# Patient Record
Sex: FEMALE | Race: BLACK OR AFRICAN AMERICAN | NOT HISPANIC OR LATINO | URBAN - METROPOLITAN AREA
[De-identification: names, ages, dates, MRNs, and addresses within clinical notes are randomized per-mention and may not be internally consistent; named-entity substitution may affect disease eponyms.]

---

## 2021-04-13 ENCOUNTER — LAB OUTSIDE AN ENCOUNTER (OUTPATIENT)
Dept: URBAN - METROPOLITAN AREA CLINIC 96 | Facility: CLINIC | Age: 56
End: 2021-04-13

## 2021-04-14 ENCOUNTER — OFFICE VISIT (OUTPATIENT)
Dept: URBAN - METROPOLITAN AREA CLINIC 96 | Facility: CLINIC | Age: 56
End: 2021-04-14

## 2021-04-14 RX ORDER — OMEPRAZOLE 20 MG/1
TABLET, DELAYED RELEASE ORAL
Qty: 0 | Refills: 0 | COMMUNITY
Start: 1900-01-01

## 2021-04-14 RX ORDER — LEVOTHYROXINE SODIUM 25 UG/1
TABLET ORAL
Qty: 0 | Refills: 0 | COMMUNITY
Start: 1900-01-01

## 2021-04-14 RX ORDER — SIMVASTATIN 10 MG/1
TABLET, FILM COATED ORAL
Qty: 0 | Refills: 0 | COMMUNITY
Start: 1900-01-01

## 2021-04-14 NOTE — HPI-TODAY'S VISIT:
This is a pleasant 56-year-old -American female referred by Dr. Mena Rush for follow-up of some GI issues and a copy of this note will be referred to the referring physician.  Patient was last seen by me in April 2018 at which time she followed up for history of GERD and chronic constipation.  She was complaining of some belching and burping despite being on PPIs.  She had some mild dysphagia.  She also had a history of some constipation and in the past had gone about twice a week.  She had a colonoscopy on September 14, 2017 that showed a 4 mm tubular adenoma and 3 mm benign lesion removed and internal hemorrhoids.  I had ordered Amitiza for constipation but she wanted to try MiraLAX.  She was doing better on omeprazole for GERD.  She had a CT scan for abdominal pain that showed some hypodensities of the liver too small to characterize so an MRI was done and these lesions did appear benign but I did refer her to see Dr. Lara of our liver center and she did see him in June 2018 as MRI in 5/2018 suggested cysts, adenomas or FNH and he felt they were benign.  I had ordered both upper endoscopy to evaluate her abdominal pain and she was not able then to do the procedures.

## 2021-07-15 ENCOUNTER — DASHBOARD ENCOUNTERS (OUTPATIENT)
Age: 56
End: 2021-07-15

## 2021-07-15 ENCOUNTER — OFFICE VISIT (OUTPATIENT)
Dept: URBAN - METROPOLITAN AREA CLINIC 92 | Facility: CLINIC | Age: 56
End: 2021-07-15
Payer: COMMERCIAL

## 2021-07-15 DIAGNOSIS — K21.9 GASTROESOPHAGEAL REFLUX DISEASE, UNSPECIFIED WHETHER ESOPHAGITIS PRESENT: ICD-10-CM

## 2021-07-15 DIAGNOSIS — K59.04 CHRONIC IDIOPATHIC CONSTIPATION: ICD-10-CM

## 2021-07-15 DIAGNOSIS — F50.9 EATING DISORDER, UNSPECIFIED TYPE: ICD-10-CM

## 2021-07-15 DIAGNOSIS — K76.9 LIVER LESION: ICD-10-CM

## 2021-07-15 DIAGNOSIS — Z86.010 PERSONAL HISTORY OF COLONIC POLYPS: ICD-10-CM

## 2021-07-15 PROBLEM — 82934008: Status: ACTIVE | Noted: 2021-04-13

## 2021-07-15 PROBLEM — 300331000: Status: ACTIVE | Noted: 2021-04-13

## 2021-07-15 PROBLEM — 428283002: Status: ACTIVE | Noted: 2021-04-13

## 2021-07-15 PROCEDURE — 99204 OFFICE O/P NEW MOD 45 MIN: CPT | Performed by: INTERNAL MEDICINE

## 2021-07-15 RX ORDER — SIMVASTATIN 10 MG/1
TABLET, FILM COATED ORAL
Qty: 0 | Refills: 0 | Status: ON HOLD | COMMUNITY
Start: 1900-01-01

## 2021-07-15 RX ORDER — LEVOTHYROXINE SODIUM 25 UG/1
TABLET ORAL
Qty: 0 | Refills: 0 | Status: ACTIVE | COMMUNITY
Start: 1900-01-01

## 2021-07-15 RX ORDER — OMEPRAZOLE 20 MG/1
TABLET, DELAYED RELEASE ORAL
Qty: 0 | Refills: 0 | Status: ON HOLD | COMMUNITY
Start: 1900-01-01

## 2021-07-15 RX ORDER — LINACLOTIDE 145 UG/1
1 CAPSULE AT LEAST 30 MINUTES BEFORE THE FIRST MEAL OF THE DAY ON AN EMPTY STOMACH CAPSULE, GELATIN COATED ORAL ONCE A DAY
Qty: 30 | Refills: 4 | OUTPATIENT
Start: 2021-07-15 | End: 2021-12-11

## 2021-07-15 NOTE — HPI-TODAY'S VISIT:
This is a 56-year-old female referred by Dr. Lena Rush for GI consultation of GERD and abdominal pain and a copy this note be sent to the referring provider.  Upon review the chart she saw me last in April 2018 at which time she followed up after her colonoscopy that was done on September 14, 2017.  This exam revealed a 4 mm tubular adenoma and a 3 mm benign lesion removed as well as internal hemorrhoids.  Patient had chronic constipation so she had been started on Amitiza for this and she also had a history of GERD and was using omeprazole.  She also complained of abdominal pain so had a CT in May of that year that showed small hypodensities of the liver too small to characterize.  I offered her an MRI for further characterization and she declined.  When she saw me she noted that she had tried Linzess that led to cramping and MiraLAX did not work.  I ordered Amitiza 24 mcg twice a day to try.  I also recommend an upper endoscopy to follow-up on her GERD and a colonoscopy recall was set for September 2021. Pt took pecid ac for reflux no longer on ranitidine. Pt does not like the prilosec. Pt has done a CT scan by Dr Mena Rush yesterday, has liver cysts. Pt had an MRI of abd she says by another doctor. Pt does not recall using amitiza. Pt uses an otc laxative pill once a week. Pt only goes once a week. Pt has a restrictive eating- she eats a reasonable breakfas then eats little of half amts of food for lunch and dinner due to pain. Pt says she has an appt to see the dietician and they are thinking that she might have an eating disorder. Pt thinks she would eat if she emptied more. Pt does not want to do her colonoscopy right now.

## 2024-09-19 ENCOUNTER — OFFICE VISIT (OUTPATIENT)
Dept: URBAN - METROPOLITAN AREA CLINIC 92 | Facility: CLINIC | Age: 59
End: 2024-09-19

## 2024-09-23 ENCOUNTER — OFFICE VISIT (OUTPATIENT)
Dept: URBAN - METROPOLITAN AREA CLINIC 92 | Facility: CLINIC | Age: 59
End: 2024-09-23

## 2024-09-23 RX ORDER — OMEPRAZOLE 20 MG/1
TABLET, DELAYED RELEASE ORAL
Qty: 0 | Refills: 0 | Status: ON HOLD | COMMUNITY
Start: 1900-01-01

## 2024-09-23 RX ORDER — SIMVASTATIN 10 MG/1
TABLET, FILM COATED ORAL
Qty: 0 | Refills: 0 | Status: ON HOLD | COMMUNITY
Start: 1900-01-01

## 2024-09-23 RX ORDER — LEVOTHYROXINE SODIUM 25 UG/1
TABLET ORAL
Qty: 0 | Refills: 0 | Status: ACTIVE | COMMUNITY
Start: 1900-01-01

## 2024-09-23 NOTE — HPI-TODAY'S VISIT:
Patient is a 59 year old female who presents in follow up. Previously seen by Dr. Barrios in 2021 for GERD and constipation.  Colonoscopy  September 14, 2017.  This exam revealed a 4 mm tubular adenoma and a 3 mm benign lesion removed as well as internal hemorrhoids.  Patient had chronic constipation so she had been started on Amitiza for this and she also had a history of GERD and was using omeprazole.  She also complained of abdominal pain so had a CT in May of that year that showed small hypodensities of the liver too small to characterize.  I offered her an MRI for further characterization and she declined.  When she saw me she noted that she had tried Linzess that led to cramping and MiraLAX did not work.  I ordered Amitiza 24 mcg twice a day to try.  I also recommend an upper endoscopy to follow-up on her GERD and a colonoscopy recall was set for September 2021. Pt took pecid ac for reflux no longer on ranitidine. Pt does not like the prilosec. Pt has done a CT scan by Dr Mena Rush yesterday, has liver cysts. Pt had an MRI of abd she says by another doctor. Pt does not recall using amitiza. Pt uses an otc laxative pill once a week. Pt only goes once a week. Pt has a restrictive eating- she eats a reasonable breakfas then eats little of half amts of food for lunch and dinner due to pain. Pt says she has an appt to see the dietician and they are thinking that she might have an eating disorder. Pt thinks she would eat if she emptied more. Pt does not want to do her colonoscopy right now.  Today,

## 2025-07-17 ENCOUNTER — DASHBOARD ENCOUNTERS (OUTPATIENT)
Age: 60
End: 2025-07-17

## 2025-07-17 ENCOUNTER — OFFICE VISIT (OUTPATIENT)
Dept: URBAN - METROPOLITAN AREA CLINIC 124 | Facility: CLINIC | Age: 60
End: 2025-07-17
Payer: COMMERCIAL

## 2025-07-17 DIAGNOSIS — R07.89 ATYPICAL CHEST PAIN: ICD-10-CM

## 2025-07-17 DIAGNOSIS — Z12.11 COLON CANCER SCREENING: ICD-10-CM

## 2025-07-17 DIAGNOSIS — K21.9 CHRONIC GERD: ICD-10-CM

## 2025-07-17 PROBLEM — 235595009: Status: ACTIVE | Noted: 2025-07-17

## 2025-07-17 PROCEDURE — 99203 OFFICE O/P NEW LOW 30 MIN: CPT | Performed by: PHYSICIAN ASSISTANT

## 2025-07-17 PROCEDURE — 99243 OFF/OP CNSLTJ NEW/EST LOW 30: CPT | Performed by: PHYSICIAN ASSISTANT

## 2025-07-17 RX ORDER — AMLODIPINE BESYLATE 5 MG/1
1 TABLET TABLET ORAL ONCE A DAY
Status: ACTIVE | COMMUNITY

## 2025-07-17 RX ORDER — SIMVASTATIN 10 MG/1
2 TABLETS IN THE EVENING TABLET, FILM COATED ORAL ONCE A DAY
Status: ACTIVE | COMMUNITY

## 2025-07-17 RX ORDER — LEVOTHYROXINE SODIUM 25 UG/1
1 TABLET IN THE MORNING ON AN EMPTY STOMACH TABLET ORAL ONCE A DAY
Status: ACTIVE | COMMUNITY

## 2025-07-17 RX ORDER — OMEPRAZOLE 40 MG/1
1 CAPSULE 30 MINUTES BEFORE LUNCH CAPSULE, DELAYED RELEASE ORAL ONCE A DAY
Qty: 90 | Refills: 3 | OUTPATIENT
Start: 2025-07-17

## 2025-07-17 NOTE — HPI-TODAY'S VISIT:
This patient was referred by Tamiko Cazares NP for an evaluation of GERD.  A copy of this will be sent to the referring provider. Notes GERD x 3m with heartburn, post-prandial belching, throat tightness, post-prandial abd pain and chest tightness. She went to ER and neg EKG and currently seeing cards (unsure who) and pending cardiac eval next month. She had GERD years ago that did not respond to H2B but did respond to PPI. She never had an EGD. She has been off PPI for years and notes for the last 3m increased sx, worse with po intake. She is thin at baseline but denies change in appetite, weight loss, N/V or dysphagia. She is using H2B prn without sign relief.   Prior colon 8-10 years ago with polyps per patient. BMs formed every 2 days, non-bloody. PCP recods reviewed and CBC/CMP essentially unremarkable. Having issues with thyroid and notes palpatations and increased anxiety. +restrictive eating disorder per PCP  No FH GI malignancy

## 2025-07-17 NOTE — PHYSICAL EXAM CONSTITUTIONAL:
thin female in no acute distress , ambulating without difficulty , normal communication ability, anxious

## 2025-07-22 LAB — H PYLORI BREATH TEST: NOT DETECTED
